# Patient Record
Sex: FEMALE | Race: WHITE | NOT HISPANIC OR LATINO | URBAN - METROPOLITAN AREA
[De-identification: names, ages, dates, MRNs, and addresses within clinical notes are randomized per-mention and may not be internally consistent; named-entity substitution may affect disease eponyms.]

---

## 2020-06-16 ENCOUNTER — APPOINTMENT (RX ONLY)
Dept: URBAN - METROPOLITAN AREA CLINIC 39 | Facility: CLINIC | Age: 55
Setting detail: DERMATOLOGY
End: 2020-06-16

## 2020-06-16 DIAGNOSIS — L81.5 LEUKODERMA, NOT ELSEWHERE CLASSIFIED: ICD-10-CM

## 2020-06-16 DIAGNOSIS — D18.0 HEMANGIOMA: ICD-10-CM

## 2020-06-16 DIAGNOSIS — L91.8 OTHER HYPERTROPHIC DISORDERS OF THE SKIN: ICD-10-CM

## 2020-06-16 PROBLEM — D18.01 HEMANGIOMA OF SKIN AND SUBCUTANEOUS TISSUE: Status: ACTIVE | Noted: 2020-06-16

## 2020-06-16 PROCEDURE — ? BENIGN DESTRUCTION COSMETIC MULTI

## 2020-06-16 PROCEDURE — ? ADDITIONAL NOTES

## 2020-06-16 PROCEDURE — ? COUNSELING

## 2020-06-16 PROCEDURE — 99202 OFFICE O/P NEW SF 15 MIN: CPT

## 2020-06-16 ASSESSMENT — LOCATION ZONE DERM
LOCATION ZONE: LEG
LOCATION ZONE: ARM
LOCATION ZONE: AXILLAE

## 2020-06-16 ASSESSMENT — LOCATION DETAILED DESCRIPTION DERM
LOCATION DETAILED: RIGHT ANTERIOR AXILLA
LOCATION DETAILED: RIGHT DISTAL PRETIBIAL REGION
LOCATION DETAILED: LEFT PROXIMAL DORSAL FOREARM
LOCATION DETAILED: LEFT DISTAL PRETIBIAL REGION
LOCATION DETAILED: LEFT ANTERIOR PROXIMAL THIGH
LOCATION DETAILED: RIGHT DISTAL DORSAL FOREARM

## 2020-06-16 ASSESSMENT — LOCATION SIMPLE DESCRIPTION DERM
LOCATION SIMPLE: LEFT THIGH
LOCATION SIMPLE: LEFT PRETIBIAL REGION
LOCATION SIMPLE: RIGHT AXILLA
LOCATION SIMPLE: LEFT FOREARM
LOCATION SIMPLE: RIGHT PRETIBIAL REGION
LOCATION SIMPLE: RIGHT FOREARM

## 2020-06-16 NOTE — PROCEDURE: BENIGN DESTRUCTION COSMETIC MULTI
Price (Use Numbers Only, No Special Characters Or $): 75
Post-Care Instructions: I reviewed with the patient in detail post-care instructions. Patient is to wear sunprotection, and avoid picking at any of the treated lesions. Pt may apply Vaseline to crusted or scabbing areas.
Consent: The patient's consent was obtained including but not limited to risks of crusting, scabbing, blistering, scarring, darker or lighter pigmentary change, recurrence, incomplete removal and infection.
Anesthesia Volume In Cc: 0.5
Detail Level: Simple
Anesthesia Type: 1% lidocaine without epinephrine
Total Number Of Lesions Treated: 2

## 2020-06-16 NOTE — HPI: SKIN LESION (SKIN TAGS)
How Severe Are They?: mild
Is This A New Presentation, Or A Follow-Up?: Skin Lesions
NOT IN CHART/VERBALIZED

## 2022-06-07 ENCOUNTER — APPOINTMENT (RX ONLY)
Dept: URBAN - METROPOLITAN AREA CLINIC 39 | Facility: CLINIC | Age: 57
Setting detail: DERMATOLOGY
End: 2022-06-07

## 2022-06-07 DIAGNOSIS — L81.5 LEUKODERMA, NOT ELSEWHERE CLASSIFIED: ICD-10-CM

## 2022-06-07 DIAGNOSIS — D18.0 HEMANGIOMA: ICD-10-CM

## 2022-06-07 DIAGNOSIS — L82.0 INFLAMED SEBORRHEIC KERATOSIS: ICD-10-CM

## 2022-06-07 PROBLEM — D18.01 HEMANGIOMA OF SKIN AND SUBCUTANEOUS TISSUE: Status: ACTIVE | Noted: 2022-06-07

## 2022-06-07 PROCEDURE — 17110 DESTRUCTION B9 LES UP TO 14: CPT

## 2022-06-07 PROCEDURE — ? COUNSELING

## 2022-06-07 PROCEDURE — ? FULL BODY SKIN EXAM - DECLINED

## 2022-06-07 PROCEDURE — ? BENIGN DESTRUCTION

## 2022-06-07 PROCEDURE — 99213 OFFICE O/P EST LOW 20 MIN: CPT | Mod: 25

## 2022-06-07 PROCEDURE — ? ADDITIONAL NOTES

## 2022-06-07 PROCEDURE — ? TREATMENT REGIMEN

## 2022-06-07 ASSESSMENT — LOCATION SIMPLE DESCRIPTION DERM
LOCATION SIMPLE: LEFT PRETIBIAL REGION
LOCATION SIMPLE: RIGHT CHEEK
LOCATION SIMPLE: ABDOMEN
LOCATION SIMPLE: LEFT THIGH
LOCATION SIMPLE: RIGHT PRETIBIAL REGION

## 2022-06-07 ASSESSMENT — LOCATION DETAILED DESCRIPTION DERM
LOCATION DETAILED: LEFT PROXIMAL PRETIBIAL REGION
LOCATION DETAILED: RIGHT PROXIMAL PRETIBIAL REGION
LOCATION DETAILED: RIGHT INFERIOR CENTRAL MALAR CHEEK
LOCATION DETAILED: LEFT LATERAL ABDOMEN
LOCATION DETAILED: LEFT ANTERIOR DISTAL THIGH

## 2022-06-07 ASSESSMENT — LOCATION ZONE DERM
LOCATION ZONE: FACE
LOCATION ZONE: TRUNK
LOCATION ZONE: LEG

## 2022-06-07 NOTE — PROCEDURE: BENIGN DESTRUCTION
Include Z78.9 (Other Specified Conditions Influencing Health Status) As An Associated Diagnosis?: No
Consent: The patient's consent was obtained including but not limited to risks of crusting, scabbing, blistering, scarring, darker or lighter pigmentary change, recurrence, incomplete removal and infection.
Anesthesia Volume In Cc: 4
Medical Necessity Clause: This procedure was medically necessary because the lesions that were treated were:
Detail Level: Simple
Medical Necessity Information: It is in your best interest to select a reason for this procedure from the list below. All of these items fulfill various CMS LCD requirements except the new and changing color options.
Post-Care Instructions: I reviewed with the patient in detail post-care instructions. Patient is to wear sunprotection, and avoid picking at any of the treated lesions. Pt may apply Vaseline to crusted or scabbing areas.
Treatment Number (Will Not Render If 0): 0

## 2024-07-17 ENCOUNTER — APPOINTMENT (RX ONLY)
Dept: URBAN - METROPOLITAN AREA CLINIC 39 | Facility: CLINIC | Age: 59
Setting detail: DERMATOLOGY
End: 2024-07-17

## 2024-07-17 DIAGNOSIS — D18.0 HEMANGIOMA: ICD-10-CM

## 2024-07-17 DIAGNOSIS — L81.4 OTHER MELANIN HYPERPIGMENTATION: ICD-10-CM

## 2024-07-17 PROBLEM — D18.01 HEMANGIOMA OF SKIN AND SUBCUTANEOUS TISSUE: Status: ACTIVE | Noted: 2024-07-17

## 2024-07-17 PROCEDURE — 99213 OFFICE O/P EST LOW 20 MIN: CPT

## 2024-07-17 PROCEDURE — ? FULL BODY SKIN EXAM - DECLINED

## 2024-07-17 PROCEDURE — ? SUNSCREEN RECOMMENDATIONS

## 2024-07-17 PROCEDURE — ? COUNSELING

## 2024-07-17 ASSESSMENT — LOCATION ZONE DERM
LOCATION ZONE: FACE
LOCATION ZONE: TRUNK

## 2024-07-17 ASSESSMENT — LOCATION SIMPLE DESCRIPTION DERM
LOCATION SIMPLE: ABDOMEN
LOCATION SIMPLE: RIGHT CHEEK

## 2024-07-17 ASSESSMENT — LOCATION DETAILED DESCRIPTION DERM
LOCATION DETAILED: RIGHT INFERIOR CENTRAL MALAR CHEEK
LOCATION DETAILED: LEFT LATERAL ABDOMEN

## 2024-07-17 NOTE — HPI: SKIN LESION
What Type Of Note Output Would You Prefer (Optional)?: Standard Output
How Severe Is Your Skin Lesion?: mild
Has Your Skin Lesion Been Treated?: not been treated
Is This A New Presentation, Or A Follow-Up?: Skin Lesions
Is This A New Presentation, Or A Follow-Up?: Skin Lesion
[id number]: [id number]